# Patient Record
Sex: FEMALE | URBAN - METROPOLITAN AREA
[De-identification: names, ages, dates, MRNs, and addresses within clinical notes are randomized per-mention and may not be internally consistent; named-entity substitution may affect disease eponyms.]

---

## 2019-07-11 ENCOUNTER — OFFICE VISIT (OUTPATIENT)
Dept: INTERNAL MEDICINE | Age: 59
End: 2019-07-11
Payer: OTHER GOVERNMENT

## 2019-07-11 VITALS
SYSTOLIC BLOOD PRESSURE: 124 MMHG | BODY MASS INDEX: 23.16 KG/M2 | OXYGEN SATURATION: 97 % | TEMPERATURE: 98.7 F | WEIGHT: 139 LBS | DIASTOLIC BLOOD PRESSURE: 70 MMHG | HEIGHT: 65 IN | HEART RATE: 79 BPM

## 2019-07-11 DIAGNOSIS — H61.21 IMPACTED CERUMEN OF RIGHT EAR: ICD-10-CM

## 2019-07-11 DIAGNOSIS — J02.9 ACUTE PHARYNGITIS, UNSPECIFIED ETIOLOGY: Primary | ICD-10-CM

## 2019-07-11 DIAGNOSIS — J02.9 SORE THROAT: ICD-10-CM

## 2019-07-11 LAB — S PYO AG THROAT QL: NORMAL

## 2019-07-11 PROCEDURE — 99213 OFFICE O/P EST LOW 20 MIN: CPT | Performed by: NURSE PRACTITIONER

## 2019-07-11 PROCEDURE — 87880 STREP A ASSAY W/OPTIC: CPT | Performed by: NURSE PRACTITIONER

## 2019-07-11 PROCEDURE — 69210 REMOVE IMPACTED EAR WAX UNI: CPT | Performed by: NURSE PRACTITIONER

## 2019-07-11 RX ORDER — AMOXICILLIN AND CLAVULANATE POTASSIUM 875; 125 MG/1; MG/1
1 TABLET, FILM COATED ORAL 2 TIMES DAILY
Qty: 20 TABLET | Refills: 0 | Status: SHIPPED | OUTPATIENT
Start: 2019-07-11 | End: 2019-07-21

## 2019-07-11 ASSESSMENT — ENCOUNTER SYMPTOMS
SWOLLEN GLANDS: 1
DIARRHEA: 0
COUGH: 0
RHINORRHEA: 0
SHORTNESS OF BREATH: 0
NAUSEA: 0
WHEEZING: 0
SORE THROAT: 1
TROUBLE SWALLOWING: 1
VOMITING: 0

## 2019-07-11 ASSESSMENT — PATIENT HEALTH QUESTIONNAIRE - PHQ9
SUM OF ALL RESPONSES TO PHQ QUESTIONS 1-9: 0
1. LITTLE INTEREST OR PLEASURE IN DOING THINGS: 0
SUM OF ALL RESPONSES TO PHQ QUESTIONS 1-9: 0
2. FEELING DOWN, DEPRESSED OR HOPELESS: 0
SUM OF ALL RESPONSES TO PHQ9 QUESTIONS 1 & 2: 0

## 2019-07-11 NOTE — PROGRESS NOTES
Subjective  Otis Quintanilla, 63 y.o. female presents today with:  Chief Complaint   Patient presents with    Pharyngitis     x 2 days, otc aspirin, zinc and gargling salt water - feels better at this moment than she did this morning, \"throat feels swollen and is red\"       Pharyngitis   This is a new problem. Episode onset: 2 days ago. The problem occurs constantly. The problem has been gradually worsening. Associated symptoms include fatigue, a sore throat and swollen glands. Pertinent negatives include no chest pain, chills, congestion, coughing, fever, headaches, joint swelling, myalgias, nausea, neck pain or vomiting. The symptoms are aggravated by swallowing. She has tried NSAIDs (salt water gargle, zinc) for the symptoms. The treatment provided mild relief. Review of Systems   Constitutional: Positive for fatigue. Negative for chills and fever. HENT: Positive for sore throat and trouble swallowing. Negative for congestion, ear pain and rhinorrhea. Respiratory: Negative for cough, shortness of breath and wheezing. Cardiovascular: Negative for chest pain and palpitations. Gastrointestinal: Negative for diarrhea, nausea and vomiting. Musculoskeletal: Negative for joint swelling, myalgias and neck pain. Neurological: Negative for dizziness, light-headedness and headaches. PMH, Surgical Hx, Family Hx, and Social Hx reviewed and updated. Health Maintenance reviewed. Objective  Vitals:    07/11/19 1303   BP: 124/70   Pulse: 79   Temp: 98.7 °F (37.1 °C)   SpO2: 97%   Weight: 139 lb (63 kg)   Height: 5' 5\" (1.651 m)     BP Readings from Last 3 Encounters:   07/11/19 124/70     Wt Readings from Last 3 Encounters:   07/11/19 139 lb (63 kg)     Physical Exam   Constitutional: She is oriented to person, place, and time. She appears well-developed and well-nourished. She is active and cooperative. She appears ill. HENT:   Head: Normocephalic and atraumatic.    Right Ear: Tympanic

## 2019-07-14 LAB — THROAT CULTURE: NORMAL

## 2024-07-02 PROCEDURE — 88305 TISSUE EXAM BY PATHOLOGIST: CPT | Performed by: DERMATOLOGY

## 2024-07-05 ENCOUNTER — LAB REQUISITION (OUTPATIENT)
Dept: DERMATOPATHOLOGY | Facility: CLINIC | Age: 64
End: 2024-07-05
Payer: COMMERCIAL

## 2024-07-05 DIAGNOSIS — D48.5 NEOPLASM OF UNCERTAIN BEHAVIOR OF SKIN: ICD-10-CM

## 2024-07-08 LAB
LABORATORY COMMENT REPORT: NORMAL
PATH REPORT.FINAL DX SPEC: NORMAL
PATH REPORT.GROSS SPEC: NORMAL
PATH REPORT.MICROSCOPIC SPEC OTHER STN: NORMAL
PATH REPORT.RELEVANT HX SPEC: NORMAL
PATH REPORT.TOTAL CANCER: NORMAL